# Patient Record
Sex: MALE | Race: WHITE | NOT HISPANIC OR LATINO
[De-identification: names, ages, dates, MRNs, and addresses within clinical notes are randomized per-mention and may not be internally consistent; named-entity substitution may affect disease eponyms.]

---

## 2021-01-13 PROBLEM — Z00.00 ENCOUNTER FOR PREVENTIVE HEALTH EXAMINATION: Status: ACTIVE | Noted: 2021-01-13

## 2021-01-20 ENCOUNTER — APPOINTMENT (OUTPATIENT)
Dept: OTOLARYNGOLOGY | Facility: CLINIC | Age: 66
End: 2021-01-20
Payer: COMMERCIAL

## 2021-01-20 VITALS
SYSTOLIC BLOOD PRESSURE: 126 MMHG | TEMPERATURE: 97.6 F | HEART RATE: 97 BPM | OXYGEN SATURATION: 98 % | HEIGHT: 72 IN | DIASTOLIC BLOOD PRESSURE: 81 MMHG | WEIGHT: 175 LBS | BODY MASS INDEX: 23.7 KG/M2

## 2021-01-20 DIAGNOSIS — Z78.9 OTHER SPECIFIED HEALTH STATUS: ICD-10-CM

## 2021-01-20 DIAGNOSIS — Z86.718 PERSONAL HISTORY OF OTHER VENOUS THROMBOSIS AND EMBOLISM: ICD-10-CM

## 2021-01-20 DIAGNOSIS — Z80.9 FAMILY HISTORY OF MALIGNANT NEOPLASM, UNSPECIFIED: ICD-10-CM

## 2021-01-20 PROCEDURE — 31575 DIAGNOSTIC LARYNGOSCOPY: CPT

## 2021-01-20 PROCEDURE — 99072 ADDL SUPL MATRL&STAF TM PHE: CPT

## 2021-01-20 PROCEDURE — 99204 OFFICE O/P NEW MOD 45 MIN: CPT | Mod: 25

## 2021-01-21 PROBLEM — Z86.718 HISTORY OF BLOOD CLOTS: Status: RESOLVED | Noted: 2021-01-21 | Resolved: 2021-01-21

## 2021-01-21 PROBLEM — Z78.9 NON-SMOKER: Status: ACTIVE | Noted: 2021-01-21

## 2021-01-21 PROBLEM — Z80.9 FAMILY HISTORY OF MALIGNANT NEOPLASM: Status: ACTIVE | Noted: 2021-01-21

## 2021-01-21 RX ORDER — ENOXAPARIN SODIUM 100 MG/ML
80 INJECTION SUBCUTANEOUS
Refills: 0 | Status: ACTIVE | COMMUNITY

## 2021-01-21 RX ORDER — FAMOTIDINE 10 MG/1
TABLET, FILM COATED ORAL
Refills: 0 | Status: ACTIVE | COMMUNITY

## 2021-01-21 RX ORDER — STANDARDIZED SENNA CONCENTRATE 8.6 MG/1
TABLET ORAL
Refills: 0 | Status: ACTIVE | COMMUNITY

## 2021-01-21 RX ORDER — DOCUSATE SODIUM 100 MG/1
100 CAPSULE ORAL
Refills: 0 | Status: ACTIVE | COMMUNITY

## 2021-01-21 RX ORDER — ONDANSETRON HYDROCHLORIDE 24 MG/1
TABLET, FILM COATED ORAL
Refills: 0 | Status: ACTIVE | COMMUNITY

## 2021-01-21 NOTE — PHYSICAL EXAM
[Laryngoscopy Performed] : laryngoscopy was performed, see procedure section for findings [Normal] : orientation to person, place, and time: normal [FreeTextEntry2] : right cheek  1.5 cm cutaneous nodule with necrotic center. [de-identified] : no stridor, no respiratory distress

## 2021-01-21 NOTE — REASON FOR VISIT
[Initial Evaluation] : an initial evaluation for [Spouse] : spouse [FreeTextEntry2] : esophageal cancer

## 2021-01-21 NOTE — DATA REVIEWED
[de-identified] : esophageal biopsy pathology report [de-identified] : multiple CT and MRI reports [de-identified] : [facility] records

## 2021-01-21 NOTE — HISTORY OF PRESENT ILLNESS
[de-identified] : 65M with incidental finding of metastatic esophageal adenocarcinoma after his GI decided to do an extended endoscopy during a routine colonoscopy.  He has been treated by systemic therapy and RT to brain, right cheek, and lung metastasis [place] [facility] [place].  He was referred by a friend for a second opinion regarding his treatment.  He denies dysphagia, odynophagia, or hematemesis.\par \par Treatment (see scanned patient typed summary):\par 9/2020 Fluoruracil + nivolumab.  \par Nivolumab d/c due to adverse effects/shock requiring hospitalization.\par 10/2020 stereotactic radiation ([place]) to brain. Fluorouracil dose reduced to 80%\par 12/2020 - RT to rib tumor completed in January.  Chemo changed to paclitaxel + ramucirumab.\par 1/2021 Pt became SOB/fatigued - received blood transfusion with improvement. Chemo changed to paclitaxel + carboplatin. RT to right cheek.\par \par 9/10/20 biopsy: infiltrating adenocarcinoma, moderately differentiated.\par \par 10/9/20 CT C/A/P: Decreased size of b/l pulm mets, circumferential wall thickening involving distal esophagus extending to gastroesophageal junction consistent with known neoplasm, right 6th metastasis, unchanged sclerotic foxi in T3 and T12 vertebral bodies, subcutaneous soft tissue metastasis in midline anterior abdominal wall and midline upper back.\par \par 12/7/20 CT C/A/P: Since 10/9/20, probably unchanged circumferential wall thickening involving the distal esophagus. Increased size of RLL pulm metastasis.  Right 6th rib metastasis 9.2 x 7 cm (previously 7.5 x 6 cm). Decrease in size of subcutaneous soft tissue mass in midline upper back and midline anterior abdominal wall.\par \par 12/10/20 MRI brain: No definite new or suspicious enhancing brain lesions. Increased size of a 1.3 x 0.8 cm round cutaneous nodule along right lateral face.\par \par 12/31/20 CT chest: Unchanged mild residual circumferential thickening of the lower esophagus consistent with known neoplasm.  Since 12/7/20, increased extraosseous soft tissue component of a right sixth rib metastasis (10.4 x 8.7 cm, previously 9.2 x 7 cm). Resolution of right lower lobe metastasis.  Additional subcm pulmonary metastasis unchanged. Unchanged sclerotic lesions in T3 and T12 vertebral bodies. Punctate residual abnormality in region of previous soft tissue metastasis in midline upper back, unchanged.

## 2021-01-21 NOTE — PROCEDURE
[Image(s) Captured] : image(s) captured and filed [Unable to Cooperate with Mirror] : patient unable to cooperate with mirror [Lesion] : lesion identified by mirror examination needing further evaluation [None] : none [Flexible Endoscope] : examined with the flexible endoscope [de-identified] : Flexible fiberoptic laryngoscope advanced orally, no oropharyngeal lesions or masses identified, larynx visualized, adequate and symmetric cord adduction and abduction noted.\par

## 2021-01-21 NOTE — ASSESSMENT
[FreeTextEntry1] : 65M with distal esophageal adenocarcinoma with multiple areas of metastasis, treated with systemic therapy and RT to the brain, rib, and right cheek at [facility] [place], requesting second opinion on treatment.\par \par Referred to medical oncologist Sigrid Mcconnell.

## 2021-01-26 ENCOUNTER — APPOINTMENT (OUTPATIENT)
Dept: HEMATOLOGY ONCOLOGY | Facility: CLINIC | Age: 66
End: 2021-01-26
Payer: COMMERCIAL

## 2021-01-26 VITALS
WEIGHT: 173 LBS | BODY MASS INDEX: 23.43 KG/M2 | OXYGEN SATURATION: 100 % | HEART RATE: 108 BPM | SYSTOLIC BLOOD PRESSURE: 113 MMHG | TEMPERATURE: 97.5 F | HEIGHT: 72 IN | DIASTOLIC BLOOD PRESSURE: 78 MMHG | RESPIRATION RATE: 18 BRPM

## 2021-01-26 DIAGNOSIS — C79.51 SECONDARY MALIGNANT NEOPLASM OF BONE: ICD-10-CM

## 2021-01-26 DIAGNOSIS — C78.00 SECONDARY MALIGNANT NEOPLASM OF UNSPECIFIED LUNG: ICD-10-CM

## 2021-01-26 DIAGNOSIS — C79.31 SECONDARY MALIGNANT NEOPLASM OF BRAIN: ICD-10-CM

## 2021-01-26 DIAGNOSIS — C80.1 SECONDARY MALIGNANT NEOPLASM OF OTHER SPECIFIED SITES: ICD-10-CM

## 2021-01-26 DIAGNOSIS — C15.9 MALIGNANT NEOPLASM OF ESOPHAGUS, UNSPECIFIED: ICD-10-CM

## 2021-01-26 DIAGNOSIS — C79.89 SECONDARY MALIGNANT NEOPLASM OF OTHER SPECIFIED SITES: ICD-10-CM

## 2021-01-26 DIAGNOSIS — I82.409 ACUTE EMBOLISM AND THROMBOSIS OF UNSPECIFIED DEEP VEINS OF UNSPECIFIED LOWER EXTREMITY: ICD-10-CM

## 2021-01-26 DIAGNOSIS — C15.9 SECONDARY MALIGNANT NEOPLASM OF UNSPECIFIED SITE: ICD-10-CM

## 2021-01-26 DIAGNOSIS — C79.9 SECONDARY MALIGNANT NEOPLASM OF UNSPECIFIED SITE: ICD-10-CM

## 2021-01-26 PROCEDURE — 99072 ADDL SUPL MATRL&STAF TM PHE: CPT

## 2021-01-26 PROCEDURE — 99204 OFFICE O/P NEW MOD 45 MIN: CPT

## 2021-01-26 NOTE — ASSESSMENT
[FreeTextEntry1] : 66 yo M is here for initial consult and second opinion for his metastatic esophageal adenocarcinoma (brain, rib, lung, spinal and right cheek mets). In summary, he has been treated at [facility] [place] and follows with medical oncology and radiation oncology. Details obtained from patient and Dr. Harris's note. Patient is S/P 9/2020 FOLFOX + nivolumab.. 10/2020 stereotactic radiation ([place]) to brain. FOLFOX dose reduced to 80%. Nivolumab d/c due to adverse effects/shock requiring hospitalization 11/20 12/2020 - RT to rib tumor completed in January. Chemotherapy changed to paclitaxel + ramucirumab.1/2021 Pt became SOB/fatigued - was found to be anemic received blood transfusion with improvement. Chemo changed to paclitaxel + carboplatin. He is also S/P RT to right cheek.\par Now currently on weekly Carboplatin and paclitaxel s/p 1 cycle. \par \par 10/9/20 CT C/A/P: Decreased size of b/l pulm mets, circumferential wall thickening involving distal esophagus extending to gastroesophageal junction consistent with known neoplasm, right 6th metastasis, unchanged sclerotic foxi in T3 and T12 vertebral bodies, subcutaneous soft tissue metastasis in midline anterior abdominal wall and midline upper back.\par \par 12/7/20 CT C/A/P: Since 10/9/20, probably unchanged circumferential wall thickening involving the distal esophagus. Increased size of RLL pulm metastasis. Right 6th rib metastasis 9.2 x 7 cm (previously 7.5 x 6 cm). Decrease in size of subcutaneous soft tissue mass in midline upper back and midline anterior abdominal wall.\par \par 12/10/20 MRI brain: No definite new or suspicious enhancing brain lesions. Increased size of a 1.3 x 0.8 cm round cutaneous nodule along right lateral face.\par \par 12/31/20 CT chest: Unchanged mild residual circumferential thickening of the lower esophagus consistent with known neoplasm. Since 12/7/20, increased extraosseous soft tissue component of a right sixth rib metastasis (10.4 x 8.7 cm, previously 9.2 x 7 cm). Resolution of right lower lobe metastasis. Additional subcm pulmonary metastasis unchanged. Unchanged sclerotic lesions in T3 and T12 vertebral bodies. Punctate residual abnormality in region of previous soft tissue metastasis in midline upper back, unchanged. \par \par #Stage IV  metastatic esophageal adenocarcinoma (brain, rib, lung, ant abd wall, spinal and right cheek mets)\par - On weekly Paclitaxel and Carboplatin, now\par - Recommended to patient to complete 3 months of therapy with Paclitaxel and carboplatin and then repeating CT scan to evaluate response to treatment. \par - Of note, last CT scan from 12/31 with resolution of RLL pulmonary met\par - Recommended he follow up with [facility]. \par - Asked for complete records from [facility] to be sent to us for further review, including reports of CT scans, MRI, pathology reports. , .\par - Will also check to see if patient had NGS performed and if any actionable mutations were identified. \par - Patient can follow up after completing therapy and after he has had a repeat CT scan performed\par - Will reevaluate further therapy at that time. 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 \par

## 2021-01-26 NOTE — REVIEW OF SYSTEMS
[Fatigue] : fatigue [Recent Change In Weight] : ~T recent weight change [SOB on Exertion] : shortness of breath during exertion [Negative] : Allergic/Immunologic [Fever] : no fever [Chills] : no chills [Night Sweats] : no night sweats

## 2021-01-26 NOTE — PHYSICAL EXAM
[Normal] : affect appropriate [Ambulatory and capable of all self care but unable to carry out any work activities] : Status 2- Ambulatory and capable of all self care but unable to carry out any work activities. Up and about more than 50% of waking hours [de-identified] : Ill appearing, pale [de-identified] : ROund cutaneous metastatic nodule on the right lateral face

## 2021-01-26 NOTE — HISTORY OF PRESENT ILLNESS
[de-identified] : 66 yo M is here for initial consult and second opinion for his metastatic esophageal adenocarcinoma (brain, rib, lung, spinal and right cheek mets). In summary, [place] [facility] with medical oncology and radiation oncology. Details obtained from patient and Dr. Harris's note. Patient is S/P 9/2020 FOLFOX + nivolumab.. 10/2020 stereotactic radiation ([place]) to brain. FOLFOX dose reduced to 80%. Nivolumab d/c due to adverse effects/shock requiring hospitalization 11/20 12/2020 - RT to rib tumor completed in January. Chemotherapy changed to paclitaxel + ramucirumab.1/2021 Pt became SOB/fatigued - was found to be anemic received blood transfusion with improvement. Chemo changed to paclitaxel + carboplatin. He is also S/P RT to right cheek.\par \par He currently is complaining of generalized weakness and dyspnea on exertion. Denies any other major complaints. Appetite is stable, states he feels he is losing weight but slowly.  Of note, information is per patient and medical [place] [facility] [place] available to review. \par \par Oncological Hx:\par 9/10/20 biopsy: infiltrating adenocarcinoma, moderately differentiated.\par Treatment began in 9/2020 with FOLFOX and Nivolumab.\par 10/2020 CT head with brain lesions and MRI brain with "7 small lesions" and now s/p stereotactic Brain RT\par In 11/2020 Nivolumab dc'd due to adverse reaction to drug (high fever, hypotension, hospitalization) . 11/2020 fount to have DVT, on Lovenox 1 mg/kg BID. \par 12/2020 with CT scan revealing increase in rib mets, no new mets, chemo changed to Paclitaxel & Ramucirumab\par 1/2021 s/p completion of radiation to Metastatic disease of the RIb. \par In 1/2021 he was noted to have low Hgb and weakness, ramucirumab dc'd and workup for bleed performed (was negative) \par Now currently on Carboplatin and paclitaxel s/p 1 cycle \par \par Imaging: \par 10/9/20 CT C/A/P: Decreased size of b/l pulm mets, circumferential wall thickening involving distal esophagus extending to gastroesophageal junction consistent with known neoplasm, right 6th metastasis, unchanged sclerotic foci in T3 and T12 vertebral bodies, subcutaneous soft tissue metastasis in midline anterior abdominal wall and midline upper back.\par \par 12/7/20 CT C/A/P: Since 10/9/20, probably unchanged circumferential wall thickening involving the distal esophagus. Increased size of RLL pulm metastasis. Right 6th rib metastasis 9.2 x 7 cm (previously 7.5 x 6 cm). Decrease in size of subcutaneous soft tissue mass in midline upper back and midline anterior abdominal wall.\par \par 12/10/20 MRI brain: No definite new or suspicious enhancing brain lesions. Increased size of a 1.3 x 0.8 cm round cutaneous nodule along right lateral face.\par \par 12/31/20 CT chest: Unchanged mild residual circumferential thickening of the lower esophagus consistent with known neoplasm. Since 12/7/20, increased extraosseous soft tissue component of a right sixth rib metastasis (10.4 x 8.7 cm, previously 9.2 x 7 cm). Resolution of right lower lobe metastasis. Additional subcm pulmonary metastasis unchanged. Unchanged sclerotic lesions in T3 and T12 vertebral bodies. Punctate residual abnormality in region of previous soft tissue metastasis in midline upper back, unchanged.  [de-identified] : Feels weak and SOB.